# Patient Record
Sex: FEMALE | Race: WHITE | NOT HISPANIC OR LATINO | Employment: UNEMPLOYED | URBAN - METROPOLITAN AREA
[De-identification: names, ages, dates, MRNs, and addresses within clinical notes are randomized per-mention and may not be internally consistent; named-entity substitution may affect disease eponyms.]

---

## 2018-02-21 ENCOUNTER — RECORDS - HEALTHEAST (OUTPATIENT)
Dept: LAB | Facility: CLINIC | Age: 53
End: 2018-02-21

## 2018-02-22 LAB
ANION GAP SERPL CALCULATED.3IONS-SCNC: 7 MMOL/L (ref 5–18)
BUN SERPL-MCNC: 7 MG/DL (ref 8–22)
CALCIUM SERPL-MCNC: 9.8 MG/DL (ref 8.5–10.5)
CHLORIDE BLD-SCNC: 104 MMOL/L (ref 98–107)
CHOLEST SERPL-MCNC: 238 MG/DL
CO2 SERPL-SCNC: 28 MMOL/L (ref 22–31)
CREAT SERPL-MCNC: 0.78 MG/DL (ref 0.6–1.1)
FASTING STATUS PATIENT QL REPORTED: ABNORMAL
GFR SERPL CREATININE-BSD FRML MDRD: >60 ML/MIN/1.73M2
GLUCOSE BLD-MCNC: 91 MG/DL (ref 70–125)
HDLC SERPL-MCNC: 35 MG/DL
LDLC SERPL CALC-MCNC: 143 MG/DL
POTASSIUM BLD-SCNC: 3.9 MMOL/L (ref 3.5–5)
SODIUM SERPL-SCNC: 139 MMOL/L (ref 136–145)
TRIGL SERPL-MCNC: 298 MG/DL

## 2021-05-25 ENCOUNTER — RECORDS - HEALTHEAST (OUTPATIENT)
Dept: ADMINISTRATIVE | Facility: CLINIC | Age: 56
End: 2021-05-25

## 2021-05-27 ENCOUNTER — RECORDS - HEALTHEAST (OUTPATIENT)
Dept: ADMINISTRATIVE | Facility: CLINIC | Age: 56
End: 2021-05-27

## 2021-05-28 ENCOUNTER — RECORDS - HEALTHEAST (OUTPATIENT)
Dept: ADMINISTRATIVE | Facility: CLINIC | Age: 56
End: 2021-05-28

## 2021-05-30 ENCOUNTER — RECORDS - HEALTHEAST (OUTPATIENT)
Dept: ADMINISTRATIVE | Facility: CLINIC | Age: 56
End: 2021-05-30

## 2021-06-02 ENCOUNTER — RECORDS - HEALTHEAST (OUTPATIENT)
Dept: ADMINISTRATIVE | Facility: CLINIC | Age: 56
End: 2021-06-02

## 2021-06-05 ENCOUNTER — RECORDS - HEALTHEAST (OUTPATIENT)
Dept: BONE DENSITY | Facility: CLINIC | Age: 56
End: 2021-06-05

## 2021-06-05 DIAGNOSIS — K50.00 REGIONAL ENTERITIS OF SMALL INTESTINE (H): ICD-10-CM

## 2021-06-27 ENCOUNTER — HEALTH MAINTENANCE LETTER (OUTPATIENT)
Age: 56
End: 2021-06-27

## 2021-07-13 ENCOUNTER — RECORDS - HEALTHEAST (OUTPATIENT)
Dept: ADMINISTRATIVE | Facility: CLINIC | Age: 56
End: 2021-07-13

## 2021-10-17 ENCOUNTER — HEALTH MAINTENANCE LETTER (OUTPATIENT)
Age: 56
End: 2021-10-17

## 2022-07-24 ENCOUNTER — HEALTH MAINTENANCE LETTER (OUTPATIENT)
Age: 57
End: 2022-07-24

## 2022-10-02 ENCOUNTER — HEALTH MAINTENANCE LETTER (OUTPATIENT)
Age: 57
End: 2022-10-02

## 2023-08-12 ENCOUNTER — HEALTH MAINTENANCE LETTER (OUTPATIENT)
Age: 58
End: 2023-08-12

## 2023-12-28 ENCOUNTER — HOSPITAL ENCOUNTER (EMERGENCY)
Facility: CLINIC | Age: 58
Discharge: HOME | End: 2023-12-28
Payer: COMMERCIAL

## 2023-12-28 ENCOUNTER — APPOINTMENT (OUTPATIENT)
Dept: RADIOLOGY | Facility: CLINIC | Age: 58
End: 2023-12-28
Payer: COMMERCIAL

## 2023-12-28 VITALS
OXYGEN SATURATION: 97 % | HEART RATE: 88 BPM | DIASTOLIC BLOOD PRESSURE: 93 MMHG | TEMPERATURE: 97.8 F | SYSTOLIC BLOOD PRESSURE: 137 MMHG | RESPIRATION RATE: 18 BRPM

## 2023-12-28 DIAGNOSIS — M54.41 ACUTE RIGHT-SIDED LOW BACK PAIN WITH RIGHT-SIDED SCIATICA: ICD-10-CM

## 2023-12-28 PROBLEM — I10 PRIMARY HYPERTENSION: Status: ACTIVE | Noted: 2023-08-11

## 2023-12-28 PROBLEM — E78.5 HYPERLIPIDEMIA: Status: ACTIVE | Noted: 2023-09-19

## 2023-12-28 PROCEDURE — 99284 EMERGENCY DEPT VISIT MOD MDM: CPT

## 2023-12-28 PROCEDURE — 250N000013 HC RX MED GY IP 250 OP 250 PS 637

## 2023-12-28 PROCEDURE — 250N000012 HC RX MED GY IP 250 OP 636 PS 637

## 2023-12-28 PROCEDURE — 73502 X-RAY EXAM HIP UNI 2-3 VIEWS: CPT

## 2023-12-28 RX ORDER — HYDROCODONE BITARTRATE AND ACETAMINOPHEN 5; 325 MG/1; MG/1
1 TABLET ORAL EVERY 4 HOURS PRN
Qty: 4 TABLET | Refills: 0 | Status: SHIPPED | OUTPATIENT
Start: 2023-12-28

## 2023-12-28 RX ORDER — CYCLOBENZAPRINE HCL 10 MG
10 TABLET ORAL ONCE
Status: COMPLETED | OUTPATIENT
Start: 2023-12-28 | End: 2023-12-28

## 2023-12-28 RX ORDER — HYDROCODONE BITARTRATE AND ACETAMINOPHEN 5; 325 MG/1; MG/1
1 TABLET ORAL ONCE
Status: COMPLETED | OUTPATIENT
Start: 2023-12-28 | End: 2023-12-28

## 2023-12-28 RX ORDER — PREDNISONE 20 MG/1
40 TABLET ORAL
Qty: 10 TABLET | Refills: 0 | Status: SHIPPED | OUTPATIENT
Start: 2023-12-28 | End: 2024-01-02

## 2023-12-28 RX ORDER — CYCLOBENZAPRINE HCL 10 MG
10 TABLET ORAL 2 TIMES DAILY PRN
Qty: 14 TABLET | Refills: 0 | Status: SHIPPED | OUTPATIENT
Start: 2023-12-28 | End: 2024-01-04

## 2023-12-28 RX ADMIN — HYDROCODONE BITARTRATE AND ACETAMINOPHEN 1 TABLET: 5; 325 TABLET ORAL at 17:39

## 2023-12-28 RX ADMIN — DEXAMETHASONE 10 MG: 2 TABLET ORAL at 17:39

## 2023-12-28 RX ADMIN — CYCLOBENZAPRINE 10 MG: 10 TABLET, FILM COATED ORAL at 17:39

## 2023-12-28 ASSESSMENT — ACTIVITIES OF DAILY LIVING (ADL)
ADLS_ACUITY_SCORE: 33
ADLS_ACUITY_SCORE: 33

## 2023-12-28 NOTE — Clinical Note
Kinza Fairbanks was seen and treated in our emergency department on 12/28/2023.  She may return to work on 12/30/2023.       If you have any questions or concerns, please don't hesitate to call.      Rama Angelo PA-C

## 2023-12-28 NOTE — ED TRIAGE NOTES
"Pt presents with R hip pain that has been going on for \"about a month\". Pt reports 10/10 pain that radiates into groin and R leg. Pt reports worsening pain that prompted pt to ED today. ABCs intact.      Triage Assessment (Adult)       Row Name 12/28/23 1527          Triage Assessment    Airway WDL WDL        Respiratory WDL    Respiratory WDL WDL        Skin Circulation/Temperature WDL    Skin Circulation/Temperature WDL WDL        Cardiac WDL    Cardiac WDL WDL        Peripheral/Neurovascular WDL    Peripheral Neurovascular WDL WDL        Cognitive/Neuro/Behavioral WDL    Cognitive/Neuro/Behavioral WDL WDL                     "

## 2023-12-28 NOTE — ED PROVIDER NOTES
"Emergency Department Encounter  [facility] EMERGENCY ROOM  Kinza Fairbanks   AGE: 58 year old SEX: female  YOB: 1965  MRN: [id number]      EVALUATION DATE & TIME: No admission date for patient encounter. ; PCP: Claudia Baptiste   ED PROVIDER: Rama Angelo PA-C    CHIEF COMPLAINT: Hip Pain (Right/)      MEDICAL DECISION MAKING   Kinza Fairbanks is a 58 year old female with a pertinent history of HTN, hyperlipidemia, Crohn's disease, who presents to the ED by walk-in for evaluation of hip pain x 1 month.  The pain was originally intermittent and manageable but has gotten significantly worse in the past few days.  Pain is described as \"shooting\" and is now radiating around right thigh into right groin.  Patient denies recent falls or trauma.  No history of cancer, fever, history of intravenous drug use, recent infection, urinary retention, and fecal incontinence.    Vitals reviewed and hemodynamically stable, afebrile at 97.8  F.  On exam, patient is well-appearing and in no acute distress.  There is no saddle anesthesia.  Decreased strength of plantarflexion of the right lower extremity.  Full and active range of motion of right knee. Normal mental status, alert and oriented x3, without focal neuro deficits. Sensation intact and symmetrical throughout. No vertebral tenderness.  There is tenderness to palpation of the right ischial tuberosity and iliotibial band.  Straight leg raise positive on the right side.     This patient presents with back pain most consistent with right sided sciatica. Given the clinical picture (no major risk factors for cancer, spinal infection, signs of cauda equina syndrome, or severe or progressive neurological deficits) advanced imaging is not indicated at this time.  XR imaging of right hip revealed normal anatomic alignment of right hip without evidence of right hip fracture or pelvic fracture. Presentation is not consistent with " malignancy (lack of history of malignancy, lack of B symptoms), fracture (no trauma, no bony tenderness to palpation), cauda equina (no bowel or urinary incontinence/retention, no saddle anesthesia, no distal weakness). Considered but think unlikely, viscus perforation, osteomyelitis or epidural abscess (no IVDU, vertebral tenderness), renal colic, pyelonephritis (afebrile, no CVAT, no urinary symptoms).    At discharge, patient's pain is not intractable and well managed with oral medications. Patient is tolerating oral intake. Patient was provided with clear, written instructions of potential danger signs and severe illness and where and when to return for emergency care or re-evaluation.  Plan to discharge home with including cyclobenzaprine, 4 tablets of Norco, and prednisone steroid burst.    History:  Supplemental history from: Documented in chart, if applicable  External Record(s) reviewed: Documented in chart, if applicable. and Outpatient Record: Urgent Care Visit on 10/18/23 and PT visit on 10/24/23    Work Up:  Chart documentation includes differential considered and any EKGs or imaging independently interpreted by provider, where specified.  In additional to work up documented, I considered the following work up: Documented in chart, if applicable.    External consultation:  Discussion of management with another provider: Documented in chart, if applicable    Complicating factors:  Care impacted by chronic illness: Hypertension  Care affected by social determinants of health: N/A    Disposition considerations: Discharge. I prescribed additional prescription strength medication(s) as charted. See documentation for any additional details.    FINAL IMPRESSION       ICD-10-CM    1. Acute right-sided low back pain with right-sided sciatica  M54.41           ED COURSE   4:03 PM I met and introduced myself to the patient. I gathered initial history and performed my physical exam. We discussed plan for initial  workup.   5:59 PM I rechecked the patient and discussed results, discharge, follow up, and reasons to return to the ED.     MEDICATIONS GIVEN IN THE EMERGENCY DEPARTMENT:   Medications   cyclobenzaprine (FLEXERIL) tablet 10 mg (10 mg Oral $Given 12/28/23 1739)   dexAMETHasone (DECADRON) tablet 10 mg (10 mg Oral $Given 12/28/23 1739)   HYDROcodone-acetaminophen (NORCO) 5-325 MG per tablet 1 tablet (1 tablet Oral $Given 12/28/23 1739)      NEW PRESCRIPTIONS STARTED AT TODAY'S ED VISIT:  Discharge Medication List as of 12/28/2023  6:31 PM        START taking these medications    Details   cyclobenzaprine (FLEXERIL) 10 MG tablet Take 1 tablet (10 mg) by mouth 2 times daily as needed for muscle spasms, Disp-14 tablet, R-0, Local PrintFirst dose given in the ED 12/28      HYDROcodone-acetaminophen (NORCO) 5-325 MG tablet Take 1 tablet by mouth every 4 hours as needed for breakthrough pain or severe pain, Disp-4 tablet, R-0, Local PrintFirst dose given in ED 12/28      predniSONE (DELTASONE) 20 MG tablet Take 2 tablets (40 mg) by mouth daily (with breakfast) for 5 days, Disp-10 tablet, R-0, Local PrintFirst dose given in the ED 12/28                 =================================================================         HISTORY OF PRESENT ILLNESS   Patient information was obtained from: Patient   Use of Intrepreter: N/A       Kinza Fairbanks is a 58 year old female with a pertinent history of HTN, hyperlipidemia, Crohn's disease, who presents to the ED by walk-in for evaluation of hip pain..     Per chart review:  10/24/2023 - Patient seen physical therapy for closed nondisplaced fracture of scaphoid, chronic right shoulder pain, chronic right wrist pain, and adhesive capsulitis of right shoulder.  10/18/2023 -patient went to [facility] urgent care for evaluation of back pain.  Patient was diagnosed with acute right-sided low back pain without sciatica.  Was given prescription for Flexeril to take 3 times  daily.    Patient reports right hip pain that started a month ago. She states that pain was initially intermittent and manageable. However, it has stated to get worse. She states that pain is now constant and radiates to right groin. Pain also waxes and wanes and is described as sharp and shooting. Patient denies any known provoking factors. Patient has taken ibuprofen and used ice and heat without relief. No sudden onset changes in bladder or bowel control.  No lower extremity weakness, saddle numbness, hypoesthesia, weight loss, night sweats, history of malignancy, immunosuppression, history of IV drug use, dysuria, fever, nausea, and vomiting. Patient reports a history of Crohn's disease. No history of diabetes or previous hip issues. No recent surgeries. No previous knee surgeries. No other complaints at this time.    MEDICAL HISTORY     History reviewed. No pertinent past medical history.    Past Surgical History:   Procedure Laterality Date    ADENOIDECTOMY      APPENDECTOMY      CHOLECYSTECTOMY      HYSTERECTOMY      NASAL SEPTUM SURGERY      OOPHORECTOMY      OVARIAN CYST SURGERY      TONSILLECTOMY      TOTAL HIP ARTHROPLASTY         Family History   Problem Relation Age of Onset    Diabetes Other     Hypertension Other        Social History     Tobacco Use    Smoking status: Every Day   Substance Use Topics    Alcohol use: No    Drug use: No       cyclobenzaprine (FLEXERIL) 10 MG tablet  HYDROcodone-acetaminophen (NORCO) 5-325 MG tablet  predniSONE (DELTASONE) 20 MG tablet  adalimumab (HUMIRA) 40 mg/0.8 mL injection  azaTHIOprine (IMURAN) 50 mg tablet  azaTHIOprine (IMURAN) 50 mg tablet  ergocalciferol (ERGOCALCIFEROL) 50,000 unit capsule  gabapentin (NEURONTIN) 300 MG capsule  methylPREDNISolone (MEDROL DOSEPACK) 4 mg tablet  ondansetron (ZOFRAN ODT) 4 MG disintegrating tablet  promethazine (PHENERGAN) 25 MG tablet        PHYSICAL EXAM   VITALS:  Patient Vitals for the past 24 hrs:   BP Temp Temp src Pulse  Resp SpO2   12/28/23 1526 (!) 137/93 97.8  F (36.6  C) Temporal 88 18 97 %     Constitutional:  Well developed, well nourished, in no acute distress  HENT: Atraumatic, normocephalic  Neck: Normal ROM, no nuchal rigidity  Respiratory:  No respiratory distress, normal non-labored respirations, equal chest rise  Cardiovascular:  Regular rate and rhythm.   Musculoskeletal:  No edema, active range of motion of right hip and knee.  Positive straight leg raise on the right.  tenderness to palpation of the right ischial tuberosity and iliotibial band.  No lumbar spine tenderness. No CVA tenderness.  Integument:  Warm, dry. No rashes.  Neurologic:   General: Alert and oriented x3, no focal neuro deficits  Strength: RLE 5/5, LLE 4/5  Sensation: RLE wnl, LLE wnl  Gait: Ambulates independently with a steady gait  Speech: No apparent expressive or receptive aphasia  Psychiatric:  Normal mood and affect. Normal judgment normal.     LABS AND IMAGING   All pertinent labs reviewed and interpreted  Labs Ordered and Resulted from Time of ED Arrival to Time of ED Departure - No data to display    XR Pelvis and Hip Right 2 Views   Final Result   IMPRESSION: Anatomic alignment right hip. No acute displaced right hip fracture. Mild bilateral hip osteoarthritis. No displaced pelvic fracture. Degenerative change lower lumbar spine.          I, Elida Alcantara, am serving as a scribe to document services personally performed by Rama Angelo PA-C, based on my observation and the provider's statements to me. IRama PA-C attest that Elida Alcantara is acting in a scribe capacity, has observed my performance of the services and has documented them in accordance with my direction.     Rama Angelo PA-C   Emergency Medicine   [facility] EMERGENCY ROOM  [address] [facility] [address] [phone]  Dept: [phone]      Rama Angelo PA-C  12/28/23 3355

## 2023-12-29 NOTE — DISCHARGE INSTRUCTIONS
Today you were seen in the emergency department for right hip pain that radiates around your right leg.  X-ray imaging of your right hip was unremarkable and showed no evidence of fracture or hip dislocation.  The exact cause for your symptoms are unclear, but based on your evaluation, they not appear to be due to a cause requiring emergent intervention at this time.  Anticipate your symptoms are due to right-sided sciatica.    Please take it easy for the next 24 to 48 hours.  As your pain allows increase physical activity after that.  Your symptoms should improve within a week and if they do not please follow-up with your primary care provider for possible physical therapy referral.      SYMPTOM MANAGEMENT  Acetaminophen (Tylenol) 1000 mg every 6 hours as needed for pain      PRESCRIPTIONS  Prednisone steroid burst - steroid, antiinflammatory medication  Cyclobenzaprine (flexeril) - Muscle relaxer that will help with muscle spasm.  This medication may make you tired so start by taking it at bedtime to see how your body handles this.  For severe, breakthrough pain, take one tablet of hydrocodone-acetaminophen (Norco) every 6 hours. This is a narcotic pain reliever so please do not drink alcohol, drive, or operate machinery while taking this medication.     Please refer to the attachments provided for more information on how to care for yourself at home.      FOLLOW UP  Please schedule an appointment with any provider at your primary care clinic within 3-5 days for a more comprehensive evaluation and follow up.     Call 911 anytime you think you may need emergency care. For example, call if:    You are unable to move a leg at all.   Call your doctor now or seek immediate medical care if:    You have new or worse symptoms in your legs or buttocks. Symptoms may include:  Numbness or tingling.  Weakness.  Pain.     You lose bladder or bowel control.   Watch closely for changes in your health, and be sure to contact your  doctor if:    You are not getting better as expected.

## 2024-03-07 ENCOUNTER — APPOINTMENT (OUTPATIENT)
Dept: RADIOLOGY | Facility: CLINIC | Age: 59
End: 2024-03-07
Attending: EMERGENCY MEDICINE
Payer: COMMERCIAL

## 2024-03-07 ENCOUNTER — HOSPITAL ENCOUNTER (EMERGENCY)
Facility: CLINIC | Age: 59
Discharge: HOME | End: 2024-03-07
Attending: EMERGENCY MEDICINE | Admitting: EMERGENCY MEDICINE
Payer: COMMERCIAL

## 2024-03-07 VITALS
RESPIRATION RATE: 20 BRPM | TEMPERATURE: 97.9 F | HEIGHT: 68 IN | BODY MASS INDEX: 28.49 KG/M2 | DIASTOLIC BLOOD PRESSURE: 88 MMHG | HEART RATE: 92 BPM | WEIGHT: 188 LBS | SYSTOLIC BLOOD PRESSURE: 141 MMHG | OXYGEN SATURATION: 96 %

## 2024-03-07 DIAGNOSIS — J18.9 PNEUMONIA OF LEFT LUNG DUE TO INFECTIOUS ORGANISM, UNSPECIFIED PART OF LUNG: ICD-10-CM

## 2024-03-07 LAB
ANION GAP SERPL CALCULATED.3IONS-SCNC: 14 MMOL/L (ref 7–15)
ATRIAL RATE - MUSE: 110 BPM
BASOPHILS # BLD AUTO: 0.1 10E3/UL (ref 0–0.2)
BASOPHILS NFR BLD AUTO: 1 %
BUN SERPL-MCNC: 14.1 MG/DL (ref 6–20)
CALCIUM SERPL-MCNC: 9.7 MG/DL (ref 8.6–10)
CHLORIDE SERPL-SCNC: 97 MMOL/L (ref 98–107)
CREAT SERPL-MCNC: 0.98 MG/DL (ref 0.51–0.95)
DEPRECATED HCO3 PLAS-SCNC: 26 MMOL/L (ref 22–29)
DIASTOLIC BLOOD PRESSURE - MUSE: NORMAL MMHG
EGFRCR SERPLBLD CKD-EPI 2021: 67 ML/MIN/1.73M2
EOSINOPHIL # BLD AUTO: 0.2 10E3/UL (ref 0–0.7)
EOSINOPHIL NFR BLD AUTO: 2 %
ERYTHROCYTE [DISTWIDTH] IN BLOOD BY AUTOMATED COUNT: 12 % (ref 10–15)
FLUAV RNA SPEC QL NAA+PROBE: NEGATIVE
FLUBV RNA RESP QL NAA+PROBE: NEGATIVE
GLUCOSE SERPL-MCNC: 122 MG/DL (ref 70–99)
HCT VFR BLD AUTO: 40.3 % (ref 35–47)
HGB BLD-MCNC: 14 G/DL (ref 11.7–15.7)
IMM GRANULOCYTES # BLD: 0 10E3/UL
IMM GRANULOCYTES NFR BLD: 0 %
INTERPRETATION ECG - MUSE: NORMAL
LYMPHOCYTES # BLD AUTO: 2.5 10E3/UL (ref 0–5.3)
LYMPHOCYTES NFR BLD AUTO: 25 %
MCH RBC QN AUTO: 30.6 PG (ref 26.5–33)
MCHC RBC AUTO-ENTMCNC: 34.7 G/DL (ref 31.5–36.5)
MCV RBC AUTO: 88 FL (ref 78–100)
MONOCYTES # BLD AUTO: 1 10E3/UL (ref 0–1.3)
MONOCYTES NFR BLD AUTO: 10 %
NEUTROPHILS # BLD AUTO: 6.3 10E3/UL (ref 1.6–8.3)
NEUTROPHILS NFR BLD AUTO: 63 %
NRBC # BLD AUTO: 0 10E3/UL
NRBC BLD AUTO-RTO: 0 /100
P AXIS - MUSE: 66 DEGREES
PLATELET # BLD AUTO: 307 10E3/UL (ref 150–450)
POTASSIUM SERPL-SCNC: 3.5 MMOL/L (ref 3.4–5.3)
PR INTERVAL - MUSE: 150 MS
QRS DURATION - MUSE: 84 MS
QT - MUSE: 354 MS
QTC - MUSE: 479 MS
R AXIS - MUSE: 2 DEGREES
RBC # BLD AUTO: 4.57 10E6/UL (ref 3.8–5.2)
RSV RNA SPEC NAA+PROBE: NEGATIVE
SARS-COV-2 RNA RESP QL NAA+PROBE: NEGATIVE
SODIUM SERPL-SCNC: 137 MMOL/L (ref 135–145)
SYSTOLIC BLOOD PRESSURE - MUSE: NORMAL MMHG
T AXIS - MUSE: 42 DEGREES
VENTRICULAR RATE- MUSE: 110 BPM
WBC # BLD AUTO: 10.1 10E3/UL (ref 4–11)

## 2024-03-07 PROCEDURE — 87637 SARSCOV2&INF A&B&RSV AMP PRB: CPT | Performed by: EMERGENCY MEDICINE

## 2024-03-07 PROCEDURE — 96374 THER/PROPH/DIAG INJ IV PUSH: CPT

## 2024-03-07 PROCEDURE — 93005 ELECTROCARDIOGRAM TRACING: CPT | Performed by: EMERGENCY MEDICINE

## 2024-03-07 PROCEDURE — 36415 COLL VENOUS BLD VENIPUNCTURE: CPT | Performed by: EMERGENCY MEDICINE

## 2024-03-07 PROCEDURE — 85025 COMPLETE CBC W/AUTO DIFF WBC: CPT | Performed by: EMERGENCY MEDICINE

## 2024-03-07 PROCEDURE — 250N000011 HC RX IP 250 OP 636: Performed by: EMERGENCY MEDICINE

## 2024-03-07 PROCEDURE — 71046 X-RAY EXAM CHEST 2 VIEWS: CPT

## 2024-03-07 PROCEDURE — 258N000003 HC RX IP 258 OP 636: Performed by: EMERGENCY MEDICINE

## 2024-03-07 PROCEDURE — 99285 EMERGENCY DEPT VISIT HI MDM: CPT | Mod: 25

## 2024-03-07 PROCEDURE — 96361 HYDRATE IV INFUSION ADD-ON: CPT

## 2024-03-07 PROCEDURE — 80048 BASIC METABOLIC PNL TOTAL CA: CPT | Performed by: EMERGENCY MEDICINE

## 2024-03-07 RX ORDER — BENZONATATE 200 MG/1
200 CAPSULE ORAL 3 TIMES DAILY PRN
Qty: 21 CAPSULE | Refills: 0 | Status: SHIPPED | OUTPATIENT
Start: 2024-03-07 | End: 2024-03-07

## 2024-03-07 RX ORDER — ALBUTEROL SULFATE 90 UG/1
2 AEROSOL, METERED RESPIRATORY (INHALATION) EVERY 6 HOURS PRN
Qty: 18 G | Refills: 0 | Status: SHIPPED | OUTPATIENT
Start: 2024-03-07

## 2024-03-07 RX ORDER — AZITHROMYCIN 250 MG/1
TABLET, FILM COATED ORAL
Qty: 6 TABLET | Refills: 0 | Status: SHIPPED | OUTPATIENT
Start: 2024-03-07 | End: 2024-03-07

## 2024-03-07 RX ORDER — BENZONATATE 200 MG/1
200 CAPSULE ORAL 3 TIMES DAILY PRN
Qty: 21 CAPSULE | Refills: 0 | Status: SHIPPED | OUTPATIENT
Start: 2024-03-07

## 2024-03-07 RX ORDER — AZITHROMYCIN 250 MG/1
TABLET, FILM COATED ORAL
Qty: 6 TABLET | Refills: 0 | Status: SHIPPED | OUTPATIENT
Start: 2024-03-07

## 2024-03-07 RX ORDER — LIDOCAINE 50 MG/G
1 PATCH TOPICAL EVERY 24 HOURS
Qty: 10 PATCH | Refills: 0 | Status: SHIPPED | OUTPATIENT
Start: 2024-03-07

## 2024-03-07 RX ORDER — AMOXICILLIN 500 MG/1
1000 CAPSULE ORAL 3 TIMES DAILY
Qty: 42 CAPSULE | Refills: 0 | Status: SHIPPED | OUTPATIENT
Start: 2024-03-07

## 2024-03-07 RX ORDER — KETOROLAC TROMETHAMINE 15 MG/ML
15 INJECTION, SOLUTION INTRAMUSCULAR; INTRAVENOUS ONCE
Status: COMPLETED | OUTPATIENT
Start: 2024-03-07 | End: 2024-03-07

## 2024-03-07 RX ORDER — AMOXICILLIN 500 MG/1
1000 CAPSULE ORAL 3 TIMES DAILY
Qty: 42 CAPSULE | Refills: 0 | Status: SHIPPED | OUTPATIENT
Start: 2024-03-07 | End: 2024-03-07

## 2024-03-07 RX ORDER — ALBUTEROL SULFATE 90 UG/1
2 AEROSOL, METERED RESPIRATORY (INHALATION) EVERY 6 HOURS PRN
Qty: 18 G | Refills: 0 | Status: SHIPPED | OUTPATIENT
Start: 2024-03-07 | End: 2024-03-07

## 2024-03-07 RX ORDER — LIDOCAINE 50 MG/G
1 PATCH TOPICAL EVERY 24 HOURS
Qty: 10 PATCH | Refills: 0 | Status: SHIPPED | OUTPATIENT
Start: 2024-03-07 | End: 2024-03-07

## 2024-03-07 RX ADMIN — KETOROLAC TROMETHAMINE 15 MG: 15 INJECTION, SOLUTION INTRAMUSCULAR; INTRAVENOUS at 01:09

## 2024-03-07 RX ADMIN — SODIUM CHLORIDE 1000 ML: 9 INJECTION, SOLUTION INTRAVENOUS at 01:10

## 2024-03-07 ASSESSMENT — COLUMBIA-SUICIDE SEVERITY RATING SCALE - C-SSRS
1. IN THE PAST MONTH, HAVE YOU WISHED YOU WERE DEAD OR WISHED YOU COULD GO TO SLEEP AND NOT WAKE UP?: NO
2. HAVE YOU ACTUALLY HAD ANY THOUGHTS OF KILLING YOURSELF IN THE PAST MONTH?: NO
6. HAVE YOU EVER DONE ANYTHING, STARTED TO DO ANYTHING, OR PREPARED TO DO ANYTHING TO END YOUR LIFE?: NO

## 2024-03-07 ASSESSMENT — ACTIVITIES OF DAILY LIVING (ADL): ADLS_ACUITY_SCORE: 33

## 2024-03-07 NOTE — ED PROVIDER NOTES
Emergency Department Encounter     Evaluation Date & Time:   No admission date for patient encounter.    CHIEF COMPLAINT:  Chest Pain, Cough, and Flu Symptoms      Triage Note:Cough for several weeks, getting worse, left sided chest pressure for 1.5 weeks, pounding headache tonight.  Cough with deep breath.                 ED COURSE & MEDICAL DECISION MAKING:     ED Course as of 03/07/24 0201   Thu Mar 07, 2024   0053 CXR (independent interpretation): subtle opacity left mid lung    Given how long pt has been coughing, will treat empirically for PNA with high dose amoxicillin and zpak.  Rx sent, as well as Rx for tessalon perles,  albuterol for cough, lidoderm patches for chest wall pain.   0135 Chemistry, CBC reassuring.   0156 Discussed results with pt and .  Discussed prescriptions, outpatient follow up.  Pt and family agreeable to discharge.  No indication for further treatment/testing in hospital or ED.     Pt with cough ongoing for weeks, ongoing left sided chest pain/pressure for 1.5 weeks with coughing, moving and palpation of area.  Pt having a HA tonight and was tired of coughing, so she came in tonight.  Pt denies recent fevers, but no thermometer at home. Denies leg pain/swelling, hx of dvt/pe, n/v/d. Pt with very much reproducible chest wall pain, so no real suspicion for ACS or PE. Do not feel CTA chest or troponin workup indicated.  Symptoms all infectious. Will get labs, hydrate, treat symptomatically and check CXR.  Anticipate home with meds for symptomatic cares.       At the conclusion of the encounter I discussed the results of all the tests and the disposition. The questions were answered. The patient or family acknowledged understanding and was agreeable with the care plan.    Medical Decision Making  Obtained supplemental history:Supplemental history obtained?: No  Reviewed external records: External records reviewed?: No  Care impacted by chronic illness:Hypertension and Smoking /  Nicotine Use  Care significantly affected by social determinants of health:N/A  Did you consider but not order tests?: Work up considered but not performed and documented in chart, if applicable  Did you interpret images independently?: Independent interpretation of ECG and images noted in documentation, when applicable.  Consultation discussion with other provider:Did you involve another provider (consultant, MH, pharmacy, etc.)?: No  Discharge. I prescribed additional prescription strength medication(s) as charted. See documentation for any additional details.    MEDICATIONS GIVEN IN THE EMERGENCY DEPARTMENT:  Medications   sodium chloride 0.9% BOLUS 1,000 mL (has no administration in time range)   ketorolac (TORADOL) injection 15 mg (15 mg Intravenous $Given 3/7/24 0109)       NEW PRESCRIPTIONS STARTED AT TODAY'S ED VISIT:  Current Discharge Medication List        START taking these medications    Details   albuterol (PROAIR HFA/PROVENTIL HFA/VENTOLIN HFA) 108 (90 Base) MCG/ACT inhaler Inhale 2 puffs into the lungs every 6 hours as needed for shortness of breath or cough  Qty: 18 g, Refills: 0    Comments: Pharmacy may dispense brand covered by insurance (Proair, or proventil or ventolin or generic albuterol inhaler)      amoxicillin (AMOXIL) 500 MG capsule Take 2 capsules (1,000 mg) by mouth 3 times daily  Qty: 42 capsule, Refills: 0      azithromycin (ZITHROMAX Z-JAYNE) 250 MG tablet Two tablets on the first day, then one tablet daily for the next 4 days  Qty: 6 tablet, Refills: 0      benzonatate (TESSALON) 200 MG capsule Take 1 capsule (200 mg) by mouth 3 times daily as needed for cough  Qty: 21 capsule, Refills: 0      lidocaine (LIDODERM) 5 % patch Place 1 patch onto the skin every 24 hours Apply to area of pain on left chest wall  Qty: 10 patch, Refills: 0             HPI   HPI     Kinza Fairbanks is a 58 year old female with a pertinent history of hypertension, hyperlipidemia, Crohn's disease, tobacco use  "who presents to this ED via walk-in for evaluation of cough    Patient reports that she has had a cough for the past few weeks that she initially thought was just a \"chest cold\", but the cough has now been worsening.  The patient reports that she will cough so hard she vomits.  The patient also reports for the past week and a half she has had left-sided rib pain that is so painful that she cannot lay on the left side.  The patient also reports chills and sweats, but does not have a thermometer, so does not know if she has had a fever.  The patient has been taking DayQuil and NyQuil without relief.  The patient also reports a headache as well.  The patient denies a personal history of lung or heart problems, but reports a family history.  Patient denies any rashes to the chest.  Patient denies any other symptoms or complaints.    REVIEW OF SYSTEMS:  See HPI     Medical History   History reviewed. No pertinent past medical history.    Past Surgical History:   Procedure Laterality Date    ADENOIDECTOMY      APPENDECTOMY      CHOLECYSTECTOMY      HYSTERECTOMY      NASAL SEPTUM SURGERY      OOPHORECTOMY      OVARIAN CYST SURGERY      TONSILLECTOMY      TOTAL HIP ARTHROPLASTY         Family History   Problem Relation Age of Onset    Diabetes Other     Hypertension Other        Social History     Tobacco Use    Smoking status: Every Day   Substance Use Topics    Alcohol use: No    Drug use: No       albuterol (PROAIR HFA/PROVENTIL HFA/VENTOLIN HFA) 108 (90 Base) MCG/ACT inhaler  amoxicillin (AMOXIL) 500 MG capsule  azithromycin (ZITHROMAX Z-JAYNE) 250 MG tablet  benzonatate (TESSALON) 200 MG capsule  lidocaine (LIDODERM) 5 % patch  adalimumab (HUMIRA) 40 mg/0.8 mL injection  azaTHIOprine (IMURAN) 50 mg tablet  azaTHIOprine (IMURAN) 50 mg tablet  ergocalciferol (ERGOCALCIFEROL) 50,000 unit capsule  gabapentin (NEURONTIN) 300 MG capsule  HYDROcodone-acetaminophen (NORCO) 5-325 MG tablet  methylPREDNISolone (MEDROL DOSEPACK) 4 " "mg tablet  ondansetron (ZOFRAN ODT) 4 MG disintegrating tablet  promethazine (PHENERGAN) 25 MG tablet        Physical Exam     Vitals:  BP (!) 174/96   Pulse 114   Temp 97.9  F (36.6  C) (Oral)   Resp 20   Ht 1.727 m (5' 8\")   Wt 85.3 kg (188 lb)   SpO2 97%   BMI 28.59 kg/m      PHYSICAL EXAM:   Physical Exam  Vitals and nursing note reviewed.   Constitutional:       General: She is not in acute distress.     Appearance: Normal appearance.   HENT:      Head: Normocephalic and atraumatic.      Nose: Nose normal.      Mouth/Throat:      Mouth: Mucous membranes are moist.   Eyes:      Pupils: Pupils are equal, round, and reactive to light.   Neck:      Vascular: No JVD.   Cardiovascular:      Rate and Rhythm: Regular rhythm. Tachycardia present.      Pulses: Normal pulses.           Radial pulses are 2+ on the right side and 2+ on the left side.        Dorsalis pedis pulses are 2+ on the right side and 2+ on the left side.   Pulmonary:      Effort: Pulmonary effort is normal. No respiratory distress.      Breath sounds: Normal breath sounds.   Chest:      Chest wall: Tenderness (left lateral chest wall) present.   Abdominal:      Palpations: Abdomen is soft.      Tenderness: There is no abdominal tenderness.   Musculoskeletal:      Cervical back: Full passive range of motion without pain and neck supple.      Comments: No calf tenderness or swelling b/l   Skin:     General: Skin is warm.      Findings: No rash.   Neurological:      General: No focal deficit present.      Mental Status: She is alert. Mental status is at baseline.      Comments: Fluent speech, no acute lateralizing deficits   Psychiatric:         Mood and Affect: Mood normal.         Behavior: Behavior normal.         Results     LAB:  All pertinent labs reviewed and interpreted  Labs Ordered and Resulted from Time of ED Arrival to Time of ED Departure   BASIC METABOLIC PANEL - Abnormal       Result Value    Sodium 137      Potassium 3.5      " Chloride 97 (*)     Carbon Dioxide (CO2) 26      Anion Gap 14      Urea Nitrogen 14.1      Creatinine 0.98 (*)     GFR Estimate 67      Calcium 9.7      Glucose 122 (*)    INFLUENZA A/B, RSV, & SARS-COV2 PCR - Normal    Influenza A PCR Negative      Influenza B PCR Negative      RSV PCR Negative      SARS CoV2 PCR Negative     CBC WITH PLATELETS AND DIFFERENTIAL    WBC Count 10.1      RBC Count 4.57      Hemoglobin 14.0      Hematocrit 40.3      MCV 88      MCH 30.6      MCHC 34.7      RDW 12.0      Platelet Count 307      % Neutrophils 63      % Lymphocytes 25      % Monocytes 10      % Eosinophils 2      % Basophils 1      % Immature Granulocytes 0      NRBCs per 100 WBC 0      Absolute Neutrophils 6.3      Absolute Lymphocytes 2.5      Absolute Monocytes 1.0      Absolute Eosinophils 0.2      Absolute Basophils 0.1      Absolute Immature Granulocytes 0.0      Absolute NRBCs 0.0         RADIOLOGY:  XR Chest 2 Views   Final Result   IMPRESSION: Interval development of a few strands of linear atelectasis in the lingula. Right lung clear. No adenopathy or effusion. Normal cardiac size and pulmonary vascularity. Mildly atherosclerotic thoracic aorta. Mild degenerative changes both    shoulders and the spine.                   ECG:  Sinus tach, rate 110, normal intervals, no acute ischemia    I independently reviewed and interpreted the patient's ECG, with comments made as listed above.    Please see scanned ECG for full report.       PROCEDURES:  Procedures:  none      FINAL IMPRESSION:    ICD-10-CM    1. Pneumonia of left lung due to infectious organism, unspecified part of lung  J18.9           0 minutes of critical care time      I, Didi Lyn, am serving as a scribe to document services personally performed by Dr. Tevin Dupont, based on my observations and the provider's statements to me. I, Tevin Dupont, DO attest that Didi Lyn is acting in a scribe capacity, has observed my performance of the services  and has documented them in accordance with my direction.      Tevin Dupont DO  Emergency Medicine  [facility] EMERGENCY ROOM  3/7/2024  12:22 AM               Tevin Dupont MD  03/07/24 0201

## 2024-03-07 NOTE — DISCHARGE INSTRUCTIONS
Take both antibiotics as directed until gone. Tessalon perles and albuterol for cough as directed/needed. Follow up with primary clinic.  Take Tylenol for pain/discomfort.  You can also try lidocaine patches I sent for pain.  Apply as directed daily.

## 2024-03-07 NOTE — ED TRIAGE NOTES
Cough for several weeks, getting worse, left sided chest pressure for 1.5 weeks, pounding headache tonight.  Cough with deep breath.

## 2024-10-05 ENCOUNTER — HEALTH MAINTENANCE LETTER (OUTPATIENT)
Age: 59
End: 2024-10-05

## 2025-02-07 ENCOUNTER — APPOINTMENT (OUTPATIENT)
Dept: CT IMAGING | Facility: HOSPITAL | Age: 60
End: 2025-02-07
Payer: COMMERCIAL

## 2025-02-07 ENCOUNTER — HOSPITAL ENCOUNTER (EMERGENCY)
Facility: HOSPITAL | Age: 60
Discharge: HOME | End: 2025-02-07
Attending: EMERGENCY MEDICINE | Admitting: EMERGENCY MEDICINE
Payer: COMMERCIAL

## 2025-02-07 VITALS
OXYGEN SATURATION: 92 % | SYSTOLIC BLOOD PRESSURE: 117 MMHG | BODY MASS INDEX: 29.35 KG/M2 | HEART RATE: 73 BPM | DIASTOLIC BLOOD PRESSURE: 68 MMHG | WEIGHT: 187 LBS | RESPIRATION RATE: 20 BRPM | HEIGHT: 67 IN | TEMPERATURE: 97.9 F

## 2025-02-07 DIAGNOSIS — R10.9 ABDOMINAL PAIN, UNSPECIFIED ABDOMINAL LOCATION: ICD-10-CM

## 2025-02-07 DIAGNOSIS — K50.10 CROHN'S DISEASE OF COLON WITHOUT COMPLICATION (H): Chronic | ICD-10-CM

## 2025-02-07 LAB
ALBUMIN SERPL BCG-MCNC: 4.6 G/DL (ref 3.5–5.2)
ALBUMIN UR-MCNC: NEGATIVE MG/DL
ALP SERPL-CCNC: 93 U/L (ref 40–150)
ALT SERPL W P-5'-P-CCNC: 39 U/L (ref 0–50)
ANION GAP SERPL CALCULATED.3IONS-SCNC: 10 MMOL/L (ref 7–15)
APPEARANCE UR: CLEAR
AST SERPL W P-5'-P-CCNC: 33 U/L (ref 0–45)
BACTERIA #/AREA URNS HPF: ABNORMAL /HPF
BASOPHILS # BLD AUTO: 0.1 10E3/UL (ref 0–0.2)
BASOPHILS NFR BLD AUTO: 1 %
BILIRUB SERPL-MCNC: 0.4 MG/DL
BILIRUB UR QL STRIP: NEGATIVE
BUN SERPL-MCNC: 12 MG/DL (ref 6–20)
CALCIUM SERPL-MCNC: 10.4 MG/DL (ref 8.8–10.4)
CHLORIDE SERPL-SCNC: 100 MMOL/L (ref 98–107)
COLOR UR AUTO: ABNORMAL
CREAT SERPL-MCNC: 1.08 MG/DL (ref 0.51–0.95)
EGFRCR SERPLBLD CKD-EPI 2021: 60 ML/MIN/1.73M2
EOSINOPHIL # BLD AUTO: 0.1 10E3/UL (ref 0–0.7)
EOSINOPHIL NFR BLD AUTO: 1 %
ERYTHROCYTE [DISTWIDTH] IN BLOOD BY AUTOMATED COUNT: 12 % (ref 10–15)
FLUAV RNA SPEC QL NAA+PROBE: NEGATIVE
FLUBV RNA RESP QL NAA+PROBE: NEGATIVE
GLUCOSE SERPL-MCNC: 91 MG/DL (ref 70–99)
GLUCOSE UR STRIP-MCNC: NEGATIVE MG/DL
HCO3 SERPL-SCNC: 29 MMOL/L (ref 22–29)
HCT VFR BLD AUTO: 43.4 % (ref 35–47)
HGB BLD-MCNC: 14.7 G/DL (ref 11.7–15.7)
HGB UR QL STRIP: ABNORMAL
HOLD SPECIMEN: NORMAL
IMM GRANULOCYTES # BLD: 0 10E3/UL
IMM GRANULOCYTES NFR BLD: 0 %
KETONES UR STRIP-MCNC: NEGATIVE MG/DL
LEUKOCYTE ESTERASE UR QL STRIP: ABNORMAL
LIPASE SERPL-CCNC: 26 U/L (ref 13–60)
LYMPHOCYTES # BLD AUTO: 2.3 10E3/UL (ref 0.8–5.3)
LYMPHOCYTES NFR BLD AUTO: 30 %
MAGNESIUM SERPL-MCNC: 1.7 MG/DL (ref 1.7–2.3)
MCH RBC QN AUTO: 29.8 PG (ref 26.5–33)
MCHC RBC AUTO-ENTMCNC: 33.9 G/DL (ref 31.5–36.5)
MCV RBC AUTO: 88 FL (ref 78–100)
MONOCYTES # BLD AUTO: 0.6 10E3/UL (ref 0–1.3)
MONOCYTES NFR BLD AUTO: 8 %
MUCOUS THREADS #/AREA URNS LPF: PRESENT /LPF
NEUTROPHILS # BLD AUTO: 4.8 10E3/UL (ref 1.6–8.3)
NEUTROPHILS NFR BLD AUTO: 61 %
NITRATE UR QL: NEGATIVE
NRBC # BLD AUTO: 0 10E3/UL
NRBC BLD AUTO-RTO: 0 /100
PH UR STRIP: 5 [PH] (ref 5–7)
PLATELET # BLD AUTO: 292 10E3/UL (ref 150–450)
POTASSIUM SERPL-SCNC: 4.5 MMOL/L (ref 3.4–5.3)
PROT SERPL-MCNC: 7.5 G/DL (ref 6.4–8.3)
RBC # BLD AUTO: 4.93 10E6/UL (ref 3.8–5.2)
RBC URINE: 2 /HPF
RSV RNA SPEC NAA+PROBE: NEGATIVE
SARS-COV-2 RNA RESP QL NAA+PROBE: NEGATIVE
SODIUM SERPL-SCNC: 139 MMOL/L (ref 135–145)
SP GR UR STRIP: 1.01 (ref 1–1.03)
SQUAMOUS EPITHELIAL: <1 /HPF
UROBILINOGEN UR STRIP-MCNC: <2 MG/DL
WBC # BLD AUTO: 7.9 10E3/UL (ref 4–11)
WBC URINE: 3 /HPF

## 2025-02-07 PROCEDURE — 80053 COMPREHEN METABOLIC PANEL: CPT | Performed by: STUDENT IN AN ORGANIZED HEALTH CARE EDUCATION/TRAINING PROGRAM

## 2025-02-07 PROCEDURE — 250N000011 HC RX IP 250 OP 636

## 2025-02-07 PROCEDURE — 250N000013 HC RX MED GY IP 250 OP 250 PS 637

## 2025-02-07 PROCEDURE — 80053 COMPREHEN METABOLIC PANEL: CPT | Performed by: EMERGENCY MEDICINE

## 2025-02-07 PROCEDURE — 87186 SC STD MICRODIL/AGAR DIL: CPT

## 2025-02-07 PROCEDURE — 74177 CT ABD & PELVIS W/CONTRAST: CPT

## 2025-02-07 PROCEDURE — 85004 AUTOMATED DIFF WBC COUNT: CPT | Performed by: STUDENT IN AN ORGANIZED HEALTH CARE EDUCATION/TRAINING PROGRAM

## 2025-02-07 PROCEDURE — 96374 THER/PROPH/DIAG INJ IV PUSH: CPT

## 2025-02-07 PROCEDURE — 87637 SARSCOV2&INF A&B&RSV AMP PRB: CPT | Performed by: STUDENT IN AN ORGANIZED HEALTH CARE EDUCATION/TRAINING PROGRAM

## 2025-02-07 PROCEDURE — 83735 ASSAY OF MAGNESIUM: CPT

## 2025-02-07 PROCEDURE — 96372 THER/PROPH/DIAG INJ SC/IM: CPT

## 2025-02-07 PROCEDURE — 85018 HEMOGLOBIN: CPT | Performed by: STUDENT IN AN ORGANIZED HEALTH CARE EDUCATION/TRAINING PROGRAM

## 2025-02-07 PROCEDURE — 87086 URINE CULTURE/COLONY COUNT: CPT

## 2025-02-07 PROCEDURE — 87637 SARSCOV2&INF A&B&RSV AMP PRB: CPT | Performed by: EMERGENCY MEDICINE

## 2025-02-07 PROCEDURE — 99285 EMERGENCY DEPT VISIT HI MDM: CPT | Mod: 25

## 2025-02-07 PROCEDURE — 250N000013 HC RX MED GY IP 250 OP 250 PS 637: Performed by: EMERGENCY MEDICINE

## 2025-02-07 PROCEDURE — 85041 AUTOMATED RBC COUNT: CPT | Performed by: STUDENT IN AN ORGANIZED HEALTH CARE EDUCATION/TRAINING PROGRAM

## 2025-02-07 PROCEDURE — 85025 COMPLETE CBC W/AUTO DIFF WBC: CPT | Performed by: EMERGENCY MEDICINE

## 2025-02-07 PROCEDURE — 81003 URINALYSIS AUTO W/O SCOPE: CPT

## 2025-02-07 PROCEDURE — 36415 COLL VENOUS BLD VENIPUNCTURE: CPT | Performed by: STUDENT IN AN ORGANIZED HEALTH CARE EDUCATION/TRAINING PROGRAM

## 2025-02-07 PROCEDURE — 83690 ASSAY OF LIPASE: CPT

## 2025-02-07 PROCEDURE — 96375 TX/PRO/DX INJ NEW DRUG ADDON: CPT

## 2025-02-07 RX ORDER — ONDANSETRON 4 MG/1
4 TABLET, ORALLY DISINTEGRATING ORAL EVERY 8 HOURS PRN
Qty: 10 TABLET | Refills: 0 | Status: SHIPPED | OUTPATIENT
Start: 2025-02-07 | End: 2025-02-12

## 2025-02-07 RX ORDER — IOPAMIDOL 755 MG/ML
90 INJECTION, SOLUTION INTRAVASCULAR ONCE
Status: COMPLETED | OUTPATIENT
Start: 2025-02-07 | End: 2025-02-07

## 2025-02-07 RX ORDER — DICYCLOMINE HYDROCHLORIDE 10 MG/1
10 CAPSULE ORAL
Qty: 10 CAPSULE | Refills: 0 | Status: SHIPPED | OUTPATIENT
Start: 2025-02-07 | End: 2025-02-12

## 2025-02-07 RX ORDER — ONDANSETRON 2 MG/ML
4 INJECTION INTRAMUSCULAR; INTRAVENOUS ONCE
Status: COMPLETED | OUTPATIENT
Start: 2025-02-07 | End: 2025-02-07

## 2025-02-07 RX ORDER — HYDROCODONE BITARTRATE AND ACETAMINOPHEN 5; 325 MG/1; MG/1
1 TABLET ORAL ONCE
Status: COMPLETED | OUTPATIENT
Start: 2025-02-07 | End: 2025-02-07

## 2025-02-07 RX ORDER — DICYCLOMINE HYDROCHLORIDE 10 MG/ML
10 INJECTION INTRAMUSCULAR ONCE
Status: COMPLETED | OUTPATIENT
Start: 2025-02-07 | End: 2025-02-07

## 2025-02-07 RX ORDER — MAGNESIUM HYDROXIDE/ALUMINUM HYDROXICE/SIMETHICONE 120; 1200; 1200 MG/30ML; MG/30ML; MG/30ML
15 SUSPENSION ORAL ONCE
Status: COMPLETED | OUTPATIENT
Start: 2025-02-07 | End: 2025-02-07

## 2025-02-07 RX ADMIN — IOPAMIDOL 90 ML: 755 INJECTION, SOLUTION INTRAVENOUS at 16:53

## 2025-02-07 RX ADMIN — ONDANSETRON 4 MG: 2 INJECTION, SOLUTION INTRAMUSCULAR; INTRAVENOUS at 15:48

## 2025-02-07 RX ADMIN — FAMOTIDINE 20 MG: 10 INJECTION, SOLUTION INTRAVENOUS at 15:50

## 2025-02-07 RX ADMIN — HYDROCODONE BITARTRATE AND ACETAMINOPHEN 1 TABLET: 5; 325 TABLET ORAL at 19:03

## 2025-02-07 RX ADMIN — ALUMINUM HYDROXIDE, MAGNESIUM HYDROXIDE, AND SIMETHICONE 15 ML: 200; 200; 20 SUSPENSION ORAL at 15:48

## 2025-02-07 RX ADMIN — DICYCLOMINE HYDROCHLORIDE 10 MG: 10 INJECTION, SOLUTION INTRAMUSCULAR at 19:04

## 2025-02-07 ASSESSMENT — COLUMBIA-SUICIDE SEVERITY RATING SCALE - C-SSRS
2. HAVE YOU ACTUALLY HAD ANY THOUGHTS OF KILLING YOURSELF IN THE PAST MONTH?: NO
6. HAVE YOU EVER DONE ANYTHING, STARTED TO DO ANYTHING, OR PREPARED TO DO ANYTHING TO END YOUR LIFE?: NO
1. IN THE PAST MONTH, HAVE YOU WISHED YOU WERE DEAD OR WISHED YOU COULD GO TO SLEEP AND NOT WAKE UP?: NO

## 2025-02-07 ASSESSMENT — ACTIVITIES OF DAILY LIVING (ADL)
ADLS_ACUITY_SCORE: 41
ADLS_ACUITY_SCORE: 41

## 2025-02-07 NOTE — ED TRIAGE NOTES
Patient c/o RLQ pain and mid abdominal pain, nausea, diarrhea, chills, headache, body aches , light head for 4 days .  Hx: Chrons      Triage Assessment (Adult)       Row Name 02/07/25 1434          Triage Assessment    Airway WDL WDL        Respiratory WDL    Respiratory WDL cough        Skin Circulation/Temperature WDL    Skin Circulation/Temperature WDL WDL        Cardiac WDL    Cardiac WDL WDL        Peripheral/Neurovascular WDL    Peripheral Neurovascular WDL WDL        Cognitive/Neuro/Behavioral WDL    Cognitive/Neuro/Behavioral WDL WDL

## 2025-02-07 NOTE — ED PROVIDER NOTES
Emergency Department Encounter   NAME: Kinza Fairbanks ; AGE: 59 year old female ; YOB: 1965 ; MRN: [id number] ; PCP: Claudia Baptiste   ED PROVIDER: Yenni Horton PA-C    Evaluation Date & Time:   No admission date for patient encounter.    CHIEF COMPLAINT:  Abdominal Pain and Nausea, Vomiting, & Diarrhea      Impression and Plan   MDM: Kinza Fairbanks is a 59 year old female who presents to the ED for evaluation of abdominal pain has been going on for the last 4 days.  She has diffuse abdominal tenderness but worse in the right and left lower quadrants.  She has nausea but no vomiting.  Patient given Pepcid, Maalox, Zofran for symptoms.  She is given Norco for pain.    Differential diagnosis includes acute emergency such as bowel obstruction, gastritis, colitis, diverticulitis, ovarian etiologies such as torsion versus cyst, UTI, pyelonephritis.  Lab work shows no leukocytosis or anemia.  Electrolytes within normal limits.  Kidney function consistent with previous.  UA does show some leukocytes and few bacteria but certainly not convincing for UTI especially without urinary symptoms.  Flu and COVID-negative.  Lipase normal.  Magnesium normal.  Likely ovarian etiology with patient age and no vaginal symptoms.  CT abdomen pelvis without any obstruction or other acute abdominal finding.  No findings on CT abdomen pelvis consistent with Crohn's flare.    On examination she is no longer having nausea but still having pain.  She is given Bentyl.  Turns out that patient recalls from when she had previous Crohn's flares that she never has any lab or imaging abnormalities.  It has been years since her last flare but she does feel this feels similar.  I discussed the case with  from [facility] who recommends C. difficile and viral stool studies.  They will see the patient next week in clinic.  She does not feel super convinced that this is a Crohn's flare and does not want to start steroids from  the ER.    Could be infectious etiology including viral or C. difficile.  Results will be her Crohn's flare.  No acute cause identified today of patient's pain.  I discussed this with the patient.  She feels comfortable with this plan.  Patient will call [facility] on Monday morning to make an appointment for early next week.  If she has any acute worsening pain or other concerns in the meantime over the weekend she can return here for reevaluation.  I did send patient with Bentyl and Zofran at home to take for pain and nausea.  Patient voices understanding is discharged stable condition    Medical Decision Making  Obtained supplemental history:Supplemental history obtained?: Documented in chart  Reviewed external records: External records reviewed?: Documented in chart  Care impacted by chronic illness:Documented in Chart  Did you consider but not order tests?: Work up considered but not performed and documented in chart, if applicable  Did you interpret images independently?: Independent interpretation of ECG and images noted in documentation, when applicable.  Consultation discussion with other provider:Did you involve another provider (consultant, , pharmacy, etc.)?: I discussed the care with another health care provider, see documentation for details.  Discharge. I prescribed additional prescription strength medication(s) as charted. See documentation for any additional details.    MIPS: Not Applicable      Critical Care: 0    ED COURSE:  3:05 PM I met and introduced myself to the patient. I gathered initial history and performed my physical exam. We discussed plan for initial workup.    I have staffed the patient with Dr. Jj, ED MD, who has evaluated the patient and agrees with all aspects of today's care.  I rechecked the patient and discussed results, discharge, follow up, and reasons to return to the ED.     At the conclusion of the encounter I discussed the results of all the tests and the  "disposition. The questions were answered. The patient or family acknowledged understanding and was agreeable with the care plan.    FINAL IMPRESSION:    ICD-10-CM    1. Abdominal pain, unspecified abdominal location  R10.9 C. difficile Toxin B PCR with reflex to C. difficile EIA     Enteric Bacteria and Virus Panel by JALEEL Stool      2. Crohn's disease of colon without complication (H)  K50.10 C. difficile Toxin B PCR with reflex to C. difficile EIA     Enteric Bacteria and Virus Panel by [facility]ALE[facility] Stool            MEDICATIONS GIVEN IN THE EMERGENCY DEPARTMENT:  Medications   ondansetron (ZOFRAN) injection 4 mg (4 mg Intravenous $Given 2/7/25 1548)   alum & mag hydroxide-simethicone (MAALOX) suspension 15 mL (15 mLs Oral $Given 2/7/25 1548)   famotidine (PEPCID) injection 20 mg (20 mg Intravenous $Given 2/7/25 1550)   iopamidol (ISOVUE-370) solution 90 mL (90 mLs Intravenous $Given 2/7/25 1653)   dicyclomine (BENTYL) injection 10 mg (10 mg Intramuscular $Given 2/7/25 1904)   HYDROcodone-acetaminophen (NORCO) 5-325 MG per tablet 1 tablet (1 tablet Oral $Given 2/7/25 1903)         NEW PRESCRIPTIONS STARTED AT TODAY'S ED VISIT:  New Prescriptions    DICYCLOMINE (BENTYL) 10 MG CAPSULE    Take 1 capsule (10 mg) by mouth 4 times daily (before meals and nightly) for 10 doses.    ONDANSETRON (ZOFRAN ODT) 4 MG ODT TAB    Take 1 tablet (4 mg) by mouth every 8 hours as needed for nausea.         HPI   Use of Intrepreter: N/A     Kinza AWILDA Fairbanks is a 59 year old female with a pertinent history of Crohn's, hyperlipidemia, hypertension who presents to the ED by private vehicle for evaluation of abdominal pain for the past 4 days..  Patient reports that she has had crampy bilateral lower abdominal pain for the past 4 days.  She reports she also gets this intermittent \"stabbing\" pain in the right lower quadrant.  Endorses nausea but no vomiting.  She has felt feverish and chilled at home but has not taken her temperature.  She has not " been eating or drinking much because it makes her symptoms worse.No urinary symptpoms.     She is currently on Entyvio for her Crohn's.  It has been well-managed.  She reports she has not had a flareup in years and does not have an remember what they treated flare with.  She has had many abdominal surgeries.  She does not have her appendix or gallbladder.  No history of small bowel obstructions.    She always has diarrhea but reports it has been worse than usual and that it is always liquid.  Reports her normal is greater than 10 soft stools a day.  However she reports it is never this liquid.  No blood.   No urinary or vaginal symptoms.     Medical History     No past medical history on file.    Past Surgical History:   Procedure Laterality Date    ADENOIDECTOMY      APPENDECTOMY      CHOLECYSTECTOMY      HYSTERECTOMY      NASAL SEPTUM SURGERY      OOPHORECTOMY      OVARIAN CYST SURGERY      TONSILLECTOMY      TOTAL HIP ARTHROPLASTY         Family History   Problem Relation Age of Onset    Diabetes Other     Hypertension Other        Social History     Tobacco Use    Smoking status: Every Day   Substance Use Topics    Alcohol use: No    Drug use: No       adalimumab (HUMIRA) 40 mg/0.8 mL injection  albuterol (PROAIR HFA/PROVENTIL HFA/VENTOLIN HFA) 108 (90 Base) MCG/ACT inhaler  amoxicillin (AMOXIL) 500 MG capsule  azaTHIOprine (IMURAN) 50 mg tablet  azaTHIOprine (IMURAN) 50 mg tablet  azithromycin (ZITHROMAX Z-JAYNE) 250 MG tablet  benzonatate (TESSALON) 200 MG capsule  dicyclomine (BENTYL) 10 MG capsule  ergocalciferol (ERGOCALCIFEROL) 50,000 unit capsule  gabapentin (NEURONTIN) 300 MG capsule  HYDROcodone-acetaminophen (NORCO) 5-325 MG tablet  lidocaine (LIDODERM) 5 % patch  methylPREDNISolone (MEDROL DOSEPACK) 4 mg tablet  ondansetron (ZOFRAN ODT) 4 MG ODT tab  promethazine (PHENERGAN) 25 MG tablet          Physical Exam     First Vitals:  Patient Vitals for the past 24 hrs:   BP Temp Temp src Pulse Resp SpO2  "Height Weight   02/07/25 1955 -- -- -- 73 -- 93 % -- --   02/07/25 1950 -- -- -- 74 -- 93 % -- --   02/07/25 1945 117/68 -- -- 59 -- (!) 91 % -- --   02/07/25 1940 -- -- -- 57 -- 93 % -- --   02/07/25 1935 (!) 142/87 -- -- 63 -- 94 % -- --   02/07/25 1431 134/81 97.9  F (36.6  C) Oral 84 20 92 % 1.702 m (5' 7\") 84.8 kg (187 lb)         PHYSICAL EXAM:   Physical Exam    Constitutional: alert,  no acute distress, appears uncomfortable in pain   Eyes: EOM intact   Neck: ROM normal  Cardio: regular rate, regular rhythm, no murmurs   Pulmonary: effort normal, lung sounds normal, no wheezing, crackles, or rales    Abdominal: flat, no distention, diffusely tender with most tenderness to the bilateral lower abdomen  MSK: no obvious swelling or deformity  Skin: no visible rashes or erythema   Neuro: no gross focal deficit, acting per baseline   Psychiatric: mood and behavior within normal limit    Results     LAB:  All pertinent labs reviewed and interpreted  Labs Ordered and Resulted from Time of ED Arrival to Time of ED Departure   COMPREHENSIVE METABOLIC PANEL - Abnormal       Result Value    Sodium 139      Potassium 4.5      Carbon Dioxide (CO2) 29      Anion Gap 10      Urea Nitrogen 12.0      Creatinine 1.08 (*)     GFR Estimate 60 (*)     Calcium 10.4      Chloride 100      Glucose 91      Alkaline Phosphatase 93      AST 33      ALT 39      Protein Total 7.5      Albumin 4.6      Bilirubin Total 0.4     ROUTINE UA WITH MICROSCOPIC REFLEX TO CULTURE - Abnormal    Color Urine Light Yellow      Appearance Urine Clear      Glucose Urine Negative      Bilirubin Urine Negative      Ketones Urine Negative      Specific Gravity Urine 1.011      Blood Urine 0.03 mg/dL (*)     pH Urine 5.0      Protein Albumin Urine Negative      Urobilinogen Urine <2.0      Nitrite Urine Negative      Leukocyte Esterase Urine 250 Hubert/uL (*)     Bacteria Urine Few (*)     Mucus Urine Present (*)     RBC Urine 2      WBC Urine 3      Squamous " Epithelials Urine <1     INFLUENZA A/B, RSV AND SARS-COV2 PCR - Normal    Influenza A PCR Negative      Influenza B PCR Negative      RSV PCR Negative      SARS CoV2 PCR Negative     LIPASE - Normal    Lipase 26     MAGNESIUM - Normal    Magnesium 1.7     CBC WITH PLATELETS AND DIFFERENTIAL    WBC Count 7.9      RBC Count 4.93      Hemoglobin 14.7      Hematocrit 43.4      MCV 88      MCH 29.8      MCHC 33.9      RDW 12.0      Platelet Count 292      % Neutrophils 61      % Lymphocytes 30      % Monocytes 8      % Eosinophils 1      % Basophils 1      % Immature Granulocytes 0      NRBCs per 100 WBC 0      Absolute Neutrophils 4.8      Absolute Lymphocytes 2.3      Absolute Monocytes 0.6      Absolute Eosinophils 0.1      Absolute Basophils 0.1      Absolute Immature Granulocytes 0.0      Absolute NRBCs 0.0     URINE CULTURE        RADIOLOGY:  CT Abdomen Pelvis w Contrast   Final Result   IMPRESSION:    1.  No acute findings.      2.  Nothing for active Crohn's disease.      3.  No significant incidental finding.        PROCEDURES:  None     Yenni Horton PA-C   Emergency Medicine   [facility] EMERGENCY DEPARTMENT       Yenni Horton PA-C  02/07/25 2040

## 2025-02-08 NOTE — ED PROVIDER NOTES
Emergency Department Staff Note  I had a face to face encounter with this patient seen by the Advanced Practice Provider (MICHAEL).  I personally made/approved the management plan and take responsibility for the patient management. I personally saw the patient and performed a substantive portion of the visit including all aspects of the medical decision making.      ED Course as of 02/07/25 1841   Fri Feb 07, 2025   5835 I discussed the case with Yenni Horton PA-C.   4450 Patient is a very pleasant 59-year-old female with a history of Crohn's who comes in today for evaluation of right sided abdominal pain.  She says this feels like the other 4-5 times that she has had a Crohn's flare.  She says in the past that have not shown up on CT.  Usually happens when she starts to get immune to her Crohn's medication.  She is due to start a new medication next month and feels like that is likely the cause.  She seems uncomfortable and does have tenderness to the right lower abdomen.  She sees Dr. Sainz in GI clinic and we can give them a call to see if they want us to treat this like a Crohn's flare.  We ordered some Bentyl for her and I ordered a dose of Norco to.  She is in agreement with the plan.  Just let me know       Medical Decision Making    History:  Supplemental history from: None  External Record(s) reviewed: I reviewed the clinic visit from 11/20/2024.  Patient was being evaluated for a preoperative evaluation.  She has a history of hypokalemia that was thought to be related chronic diarrhea related to Crohn's.  She was no longer on potassium but the plan was to restart it.    Work Up:  Emergent/Severe conditions considered and evaluated for: Electrolyte abnormality, hyperglycemia, hypoglycemia, acidosis, anemia, thrombocytopenia, renal failure, dehydration, cholecystitis, hepatitis, pancreatitis, appendicitis, bowel obstruction, renal stone, hernia, ischemic bowel, aortic aneurysm, perforated viscus, Crohn's  colitis  I independently reviewed and interpreted CT abdomen pelvis which showed no perforation. See Full radiology report for all details.   In additional to work up documented, I considered the following work up: None  Medications given that require intensive monitoring for toxicity: None    External consultation:  Discussion of management with another provider: Yenni spoke with [facility]    Complicating factors:  Care impacted by chronic illness: Anxiety, depression, hyperlipidemia, hypertension, Crohn's disease    Disposition considerations: Considered admission to the hospital but work up came back reassuring and patient was able to discharge home.    Prescriptions considered/prescribed: Bentyl, Zofran      [facility] EMERGENCY DEPARTMENT  MD Анна Rico, Munira Nicholas MD  02/07/25 1949

## 2025-02-08 NOTE — DISCHARGE INSTRUCTIONS
There is no signs of any infection or Crohn's flare on imaging.  I talked with GI like we talked about it and they are comfortable sending you home and have you call them first thing Monday morning to make an appointment.  I have ordered the stool studies for home.  You can collect them and bring them back here for evaluation.    The [facility] clinic would like to have these results by the time they see you.    I have ordered Bentyl and Zofran for home for symptoms.  You can take Zofran as needed for nausea as well as Bentyl as needed for any spasms.    Come back here over the weekend if you have any acute worsening pain or other concerning symptoms.  Otherwise I think you can follow-up with [facility].

## 2025-02-09 LAB — BACTERIA UR CULT: ABNORMAL

## 2025-02-10 ENCOUNTER — TELEPHONE (OUTPATIENT)
Dept: NURSING | Facility: CLINIC | Age: 60
End: 2025-02-10
Payer: COMMERCIAL

## 2025-02-10 DIAGNOSIS — N39.0 URINARY TRACT INFECTION: ICD-10-CM

## 2025-02-10 DIAGNOSIS — R10.84 STOMACH CRAMPS, GENERALIZED: Primary | ICD-10-CM

## 2025-02-10 NOTE — LETTER
February 10, 2025        Kinza Fairbanks   [id number]  [facility] [place] [address]          Dear Kinza Fairbanks:    You were seen in the [facility] Emergency Department at [facility] on 2/7/2025.  We are unable to reach you by phone, so we are sending you this letter.     It is important that you call [facility] Emergency Department lab result nurse at [phone], as we have information to relay to you AND/OR we MAY have to make some changes in your treatment.    Best time to call back is between 9AM and 5:30PM, 7 days a week.      Sincerely,     [facility] Emergency Department Lab Result RN  [phone]

## 2025-02-10 NOTE — TELEPHONE ENCOUNTER
[facility]    Reason for call: Lab Result Notification     Lab Result (including Rx patient on, if applicable).  If culture, copy of lab report at bottom.  Lab Result:   Final urine culture on 2/9/25 shows the presence of bacteria(s): 10,000 - 50,000 CFU/mL Escherichia coli   [facility] Emergency Dept discharge antibiotic: None  Recommendations in treatment per [facility] ED lab result Urine Culture protocol.        Allergies   Allergen Reactions    Ibuprofen Unknown     Abdominal pain, worsens crohns disease    Percocet [Oxycodone-Acetaminophen] Hives and Itching       Creatinine Level (mg/dl)   Creatinine   Date Value Ref Range Status   02/07/2025 1.08 (H) 0.51 - 0.95 mg/dL Final    Creatinine clearance (ml/min), if applicable    Serum creatinine: 1.08 mg/dL (H) 02/07/25 1504  Estimated creatinine clearance: 64.3 mL/min (A)     Patient's current Symptoms:   At 10:50, Left voicemail message requesting a call back to [facility] ED Lab Result RN at [phone]. RN is available every day between 9 a.m. and 5:30 p.m.   Letter sent    RN Recommendations/Instructions per [facility] ED lab result protocol:   [facility] ED lab result protocol utilized: urine culture          Tim Tucker RN

## 2025-02-12 RX ORDER — DICYCLOMINE HYDROCHLORIDE 10 MG/1
10 CAPSULE ORAL
Qty: 10 CAPSULE | Refills: 0 | Status: SHIPPED | OUTPATIENT
Start: 2025-02-12

## 2025-02-12 RX ORDER — ONDANSETRON 4 MG/1
4 TABLET, ORALLY DISINTEGRATING ORAL EVERY 8 HOURS PRN
Qty: 10 TABLET | Refills: 0 | Status: SHIPPED | OUTPATIENT
Start: 2025-02-12

## 2025-02-12 RX ORDER — NITROFURANTOIN 25; 75 MG/1; MG/1
100 CAPSULE ORAL 2 TIMES DAILY
Qty: 10 CAPSULE | Refills: 0 | Status: SHIPPED | OUTPATIENT
Start: 2025-02-12 | End: 2025-02-17

## 2025-02-12 NOTE — TELEPHONE ENCOUNTER
[facility]     Reason for call: Lab Result Notification      Lab Result (including Rx patient on, if applicable).  If culture, copy of lab report at bottom.  Lab Result:   Final urine culture on 2/9/25 shows the presence of bacteria(s): 10,000 - 50,000 CFU/mL Escherichia coli   [facility] Emergency Dept discharge antibiotic: None  Recommendations in treatment per [facility] ED lab result Urine Culture protocol.         Allergies         Allergies   Allergen Reactions    Ibuprofen Unknown       Abdominal pain, worsens crohns disease    Percocet [Oxycodone-Acetaminophen] Hives and Itching            Creatinine Level (mg/dl)         Creatinine   Date Value Ref Range Status   02/07/2025 1.08 (H) 0.51 - 0.95 mg/dL Final    Creatinine clearance (ml/min), if applicable    Serum creatinine: 1.08 mg/dL (H) 02/07/25 1504  Estimated creatinine clearance: 64.3 mL/min (A)       2/12/25 at 9:30 am Pt returning VM left by ED Results RN  RN Recommendations/Instructions per [facility] ED lab result protocol:   [facility] ED lab result protocol utilized: Urine Culture  Instruct to start antibiotic: Nitrofurantoin    Patient's current Symptoms at time of telephone encounter:   Frequency of urination and chills and abdominal cramps.  Pt Rx Nitrofurantoin sent to [facility] [place], [place]; in addition pt states Dicyclomine and Ondansetron ordered by ED MD was sent to [facility] insurance will not cover this MD.  Pt requesting Rx's transferred to [facility] to see if able to see if Rx will go through from an insurance perspective, per protocol Rx's transferred to [facility].    Does patient fit any of the following criteria?:     Has allergy to medications: Yes; please explain: Ibuprofen, Percocet    Is breastfeeding: N/A    Is pregnant: N/A    On Coumadin/Warfarin: No      Patient/care giver notified to contact your PCP clinic or return to the Emergency department if your:  Symptoms  return.  Symptoms do not improve after 3 days on antibiotic.  Symptoms do not resolve after completing antibiotic.  Symptoms worsen or other concerning symptoms.    Ramya Harmon RN

## 2025-03-25 ENCOUNTER — ANCILLARY PROCEDURE (OUTPATIENT)
Dept: BONE DENSITY | Facility: CLINIC | Age: 60
End: 2025-03-25
Attending: INTERNAL MEDICINE
Payer: COMMERCIAL

## 2025-03-25 DIAGNOSIS — K50.00 CROHN'S DISEASE OF SMALL INTESTINE WITHOUT COMPLICATIONS (H): ICD-10-CM

## 2025-03-25 DIAGNOSIS — R12 HEARTBURN: ICD-10-CM

## 2025-03-25 DIAGNOSIS — R05.3 CHRONIC COUGH: ICD-10-CM

## 2025-03-25 DIAGNOSIS — K52.9 CHRONIC DIARRHEA: ICD-10-CM

## 2025-03-25 DIAGNOSIS — Z72.0 TOBACCO ABUSE: ICD-10-CM

## 2025-03-25 PROCEDURE — 77080 DXA BONE DENSITY AXIAL: CPT | Mod: TC | Performed by: STUDENT IN AN ORGANIZED HEALTH CARE EDUCATION/TRAINING PROGRAM

## 2025-04-27 ENCOUNTER — HEALTH MAINTENANCE LETTER (OUTPATIENT)
Age: 60
End: 2025-04-27

## 2025-05-19 ENCOUNTER — ENROLLMENT (OUTPATIENT)
Dept: HOME HEALTH SERVICES | Facility: HOME HEALTH | Age: 60
End: 2025-05-19
Payer: COMMERCIAL

## 2025-05-19 DIAGNOSIS — K50.00 CROHN'S DISEASE OF SMALL INTESTINE (H): Primary | ICD-10-CM
